# Patient Record
(demographics unavailable — no encounter records)

---

## 2025-05-12 NOTE — HISTORY OF PRESENT ILLNESS
[de-identified] : First-time visit for this 25-year-old female otherwise relatively healthy has been having a 6-year history of bilateral hip pain.  Patient states that when she was seen by her local physician in [place] she was told of labral tears she was told to do physical therapy and avoid situations and exercise that may exacerbate her discomfort.  Patient states she has mostly left-sided groin pain anteriorly.  She denies any radiations into the leg or thigh denies any change in bowel or bladder habits or any obvious weakness or numbness in right or left lower extremity.  Patient is here for first-time evaluation currently taking no medications.

## 2025-05-12 NOTE — PHYSICAL EXAM
[de-identified] : Patient is full passive internal/external rotation of both hips when held at 90 degrees no restriction mechanical block or pain there is no significant atrophy numbness or weakness in right or left lower extremity.  She has minimal pallor or spinal tenderness palpation of the right or left-sided paraspinal lumbar musculature. [de-identified] : Hip radiographs were ordered today AP and lateral both hips were obtained showing well-preserved joint spaces with no evidence of cam morphology on the lateral projections well-preserved joint spaces no evidence of arthritis or spurs.

## 2025-05-12 NOTE — REASON FOR VISIT
[Initial Visit] : an initial visit for [Hip Pain] : hip pain [FreeTextEntry2] :  BILATERL HIP PAIN. HAD INJURY/TRAUMA.HISTORY OF LABRAL TEAR. HAVE BEEN SUFFERING WITH THIS PAIN FOR 6 YEARS. THE PAIN IS LOCALIZED AND IS INTERMITTENT. HAS DONE YEARS OF PHYSICAL THERAPY WHICH DID NOT HELP HER MUCH. No relief with PT > 6 weeks without any relief, no relief with medications within the last 3 months. Has been to other providers for this pain

## 2025-05-12 NOTE — DISCUSSION/SUMMARY
[de-identified] : Patient I discussed at length the underlying etiology of recurrent discomfort.  She has clinical exam and history that suggest 1 of 2 possible diagnoses the first to be that of labral pathology.  The other source of her discomfort could be lumbar radicular symptoms.  Due to the fact the patient been having symptoms for 6 years and unimproved with conservative measures employed by other physicians she will be sent for an MRI of the left hip to help evaluate for location and size of the labral pathology and also lumbar spine to help evaluate for issues that may cause left-sided groin pain such as lumbar spondylolisthesis or disc bulging.  Patient will be notified of the findings of these MRIs once they are available for review and any alternative treatment options at that point.  This consultation was 30 minutes exclusive teaching time and any separately billed procedures.